# Patient Record
Sex: FEMALE | Race: WHITE
[De-identification: names, ages, dates, MRNs, and addresses within clinical notes are randomized per-mention and may not be internally consistent; named-entity substitution may affect disease eponyms.]

---

## 2019-06-25 ENCOUNTER — HOSPITAL ENCOUNTER (EMERGENCY)
Dept: HOSPITAL 91 - FTE | Age: 29
Discharge: HOME | End: 2019-06-25
Payer: MEDICAID

## 2019-06-25 ENCOUNTER — HOSPITAL ENCOUNTER (EMERGENCY)
Dept: HOSPITAL 10 - FTE | Age: 29
Discharge: HOME | End: 2019-06-25
Payer: MEDICAID

## 2019-06-25 VITALS — HEART RATE: 71 BPM | RESPIRATION RATE: 18 BRPM | SYSTOLIC BLOOD PRESSURE: 97 MMHG | DIASTOLIC BLOOD PRESSURE: 54 MMHG

## 2019-06-25 VITALS
HEIGHT: 61 IN | BODY MASS INDEX: 20.06 KG/M2 | HEIGHT: 61 IN | BODY MASS INDEX: 20.06 KG/M2 | WEIGHT: 106.26 LBS | WEIGHT: 106.26 LBS

## 2019-06-25 DIAGNOSIS — H01.006: Primary | ICD-10-CM

## 2019-06-25 PROCEDURE — 99283 EMERGENCY DEPT VISIT LOW MDM: CPT

## 2019-06-25 RX ADMIN — FLUORESCEIN SODIUM 1 STRIP: 1 STRIP OPHTHALMIC at 10:11

## 2019-06-25 RX ADMIN — TETRACAINE HYDROCHLORIDE 1 DROP: 5 SOLUTION OPHTHALMIC at 10:11

## 2019-06-25 NOTE — ERD
ER Documentation


Chief Complaint


Chief Complaint





c/o left eye redness, eyelid swelling with itching for 4 days





HPI


This is a 28-year-old female patient who presents the emergency room with 


complaint of left eyelid redness that has increased in severity over 4 days.  


Started with itching to eyelid and now has redness and swelling.  Patient denies


any visual disturbance such as blurred vision, double vision, pain.  Patient 


states eyelid is painful but does not feel like there is anything in her eye and


no pain to her eyeball.  Patient is 4 months pregnant.





ROS


All systems reviewed and are negative except as per history of present illness.





Medications


Home Meds


Active Scripts


Acetaminophen* (Tylenol*) 325 Mg Tablet, 2 TAB PO Q6 PRN for PAIN AND OR 


ELEVATED TEMP, #20 TAB


   Prov:WILLIE GALLARDO NP         6/25/19


Erythromycin Base (Erythromycin) 1 Gm Oint...g., 1 APPLIC LEFT EYE QHS for 


blepharitis for 14 Days, #3.5 G


   apply to left eyelid margin at bedtime for 1-2 weeks


   Prov:WILLIE GALLARDO NP         6/25/19


Hydrophilic Base* (Aquaphor*) 454 Gm-Topical Oint, 1 APPLIC TOP BID for 10 Days,


#1 JAR


   Prov:DEION LOERA PA-C         8/9/16


Cephalexin* (Keflex*) 500 Mg Capsule, 500 MG PO QID for 7 Days, CAP


   Prov:DEION LOERA PA-C         8/9/16


Reported Medications


Multivit/Min/Fol Ac/Iron/Pren* (Prenatal S*) 1 Tab Tab, 1 TAB PO DAILY, TAB


   2/13/16


Discontinued Scripts


Ibuprofen* (Motrin*) 400 Mg Tab, 400 MG PO Q6 for pain for 7 Days, #30 TAB


   Prov:WILLIE GALLARDO NP         6/25/19


Polymyxin B Sulfate-TMP* (Polymyxin B-TMP Eye Drops*) 10 Ml Drops, 1 DROP LEFT 


EYE QID for 7 Days, #5 ML


   Prov:WILLIE GALLARDO NP         6/25/19





Allergies


Allergies:  


Coded Allergies:  


     No Known Allergy (Unverified , 11/2/15)





PMhx/Soc


Medical and Surgical Hx:  pt denies Medical Hx


History of Surgery:  No


Anesthesia Reaction:  No


Hx Neurological Disorder:  No


Hx Respiratory Disorders:  No


Hx Cardiac Disorders:  No


Hx Psychiatric Problems:  No


Hx Miscellaneous Medical Probl:  No


Hx Alcohol Use:  No


Hx Substance Use:  No


Hx Tobacco Use:  No





FmHx


Family History:  No diabetes, No coronary disease, No other





Physical Exam


Vitals





Vital Signs


  Date      Temp  Pulse  Resp  B/P (MAP)   Pulse Ox  O2          O2 Flow    FiO2


Time                                                 Delivery    Rate


   6/25/19  97.5     71    18  97/54 (68)       100


     09:10





Physical Exam


Const:   No acute distress


Head:   Atraumatic 


Eyes:    Normal Conjunctiva, sclera white, +erythema to left eyelid, no 


drainage, EOMI, PERRL


ENT:    Normal External Ears, Nose and Mouth.


Neck:               Full range of motion. No meningismus.  Lymphadenopathy


Resp:   Clear to auscultation bilaterally


Cardio:   Regular rate and rhythm, no murmurs


Neur:   Awake and alert


Psych:    Normal Mood and Affect


Results 24 hrs





Current Medications


 Medications
   Dose
          Sig/Malcolm
       Start Time
   Status  Last


 (Trade)       Ordered        Route
 PRN     Stop Time              Admin
Dose


                              Reason                                Admin


 Tetracaine     1 drop         ONCE  ONCE
    6/25/19       DC       



HCl
                          LEFT EYE
      10:00



(Tetracaine                                  6/25/19 10:01


0.5%



Steri-Unit


Sol)


 Fluorescein    1 strip        ONCE  ONCE
    6/25/19       DC       



Sodium
                       LEFT EYE
      10:00



(Fluor-I-Stri                                6/25/19 10:01


p)








Procedures/MDM


PROCEDURES/MDM





PROCEDURES:


Visual Acuity


OS: 20/30


OD: 20/40


OU: 20/30





Fluorescein and woods lamp


negative for corneal abrasion, ulcer, laceration. No FB observed. 





MDM:


Is a 28-year-old female patient who presents emergency room with a blepharitis 


to left eyelid increasing in severity over 4 days.  Clinical exam negative for 


eye injury including corneal laceration or abrasion, herpetic infection, 


periorbital cellulitis.


This patients soft tissue infection appears to be appropriate for outpatient 


treatment with close follow-up for reevaluation by a clinician within 24-48 


hours.  A serious, rapidly progressive infectious process is unlikely based upon


the patients presentation and appearance of the infection.  Antibiotic 


treatment has been initiated here and response to treatment will be based on 


reassessment at close follow-up.  The patient has been instructed on signs and 


symptoms of acute progression of infection and to return immediately if any of 


these occur. 














DISPOSITION and PLAN: 


RX: Erythromycin ointment, Tylenol


The patient has been discharge home to follow-up with community physician.





Departure


Diagnosis:  


   Primary Impression:  


   Blepharitis of eyelid of left eye


Condition:  Stable


Patient Instructions:  Blepharitis


Referrals:  


[facility]


Usted se ha hecho un examen mdico de control que le indica que no est en diana 


condicin que requiera tratamiento urgente en el Departamento de Emergencia. Un 


estudio ms profundo y el tratamiento de chand condicin pueden esperar sin ningn 


riesgo hasta que usted sea atendida/o en el consultorio de chand mdico o diana 


clnica. Es responsabilidad suya arreglar diana tasia para el seguimiento del cain.








MANEJO DE CONDICIONES NO URGENTES EN EL FUTURO


1) Si usted tiene un mdico de atencin primaria:





Usted debera llamar a chand mdico de atencin primaria antes de venir al 


departamento de emergencia. Despus de las horas de consultorio, chand doctor o chand 


asociado/a est disponible por telfono. El mdico o enfermero de john en el 


servicio telefnico puede asesorarle por chip medio para atender el problema, o 


cain contrario se puede programar diana tasia.





2) Si usted no tiene un mdico de atencin primaria:


Llame al mdico o clnica de referencia que aparece abajo jaden las horas de 


consultorio para hacer diana tasia para que le vean.





CLINICAS:


[facility]  [phone] [address] [facility]  [phone] [address] [facility] [phone] [address] [facility]. [facility]  [phone]


[address] [facility]. [facility]   [phone] [address] [facility]. [phone] 


[address] [facility] ()


Usted se ha hecho un examen mdico de control que le indica que no est en diana 


condicin que requiera tratamiento urgente en el Departamento de Emergencia. Un 


estudio ms profundo y el tratamiento de chand condicin pueden esperar sin ningn 


riesgo hasta que usted sea atendida/o en el consultorio de chand mdico o diana 


clnica. Es responsabilidad suya arreglar diana tasia para el seguimiento del cain.








MANEJO DE CONDICIONES NO URGENTES EN EL FUTURO


1) Si usted tiene un mdico de atencin primaria:





Usted debera llamar a chand mdico de atencin primaria antes de venir al 


departamento de emergencia. Despus de las horas de consultorio, chand doctor o chand 


asociado/a est disponible por telfono. El mdico o enfermero de john en el 


servicio telefnico puede asesorarle por chip medio para atender el problema, o 


cain contrario se puede programar diana tasia.





2) Si usted no tiene un mdico de atencin primaria:


Llame al mdico o condado institucions de referencia que aparece abajo jaden 


las horas de consultorio para hacer diana tasia para que le vean.








SI USTED NO PUEDE PAGAR PARA [place]OR[place] UN MEDICO puede ir a:


[facility] 


[address] [place] [address]





[facility] 


[address] [place] [address]





[facility] 


[address] [place] [address]





[place]


[facility]


[address] [place], [place] [id number] [phone]





Additional Instructions:  


Thank you very much for allowing us to participate in your care. 


Your health and safety is our top priority at [facility].





Call your primary care doctor TOMORROW for an appointment during the next 2-4 


days and bring all the information and medications prescribed. 





Have prescriptions filled and follow precisely the directions on the label. 





If the symptoms get worse and your provider is unavailable, return to the 


Emergency Department immediately.





USE ERYTHROMYCIN OINTMENT TO LEFT EYELID MARGIN AT BEDTIME FOR 1 TO 2 WEEKS.


APPLY WARM COMPRESSES TO LOOSEN ANY CRUSTED DEBRIS, USE COOL COMPRESS FOR PAIN 


RELIEF AND COMFORT.  YOU MAY ALSO USE IBUPROFEN FOR PAIN RELIEF.


RETURN TO THE EMERGENCY ROOM WITH ANY CHANGE IN VISION, SPREADING OF REDNESS AND


SWELLING TO EYELIDS.











WILLIE GALLARDO NP              Jun 25, 2019 14:27